# Patient Record
Sex: FEMALE | Race: BLACK OR AFRICAN AMERICAN | NOT HISPANIC OR LATINO | URBAN - METROPOLITAN AREA
[De-identification: names, ages, dates, MRNs, and addresses within clinical notes are randomized per-mention and may not be internally consistent; named-entity substitution may affect disease eponyms.]

---

## 2024-08-01 ENCOUNTER — LAB REQUISITION (OUTPATIENT)
Dept: LAB | Facility: HOSPITAL | Age: 54
End: 2024-08-01

## 2024-08-01 DIAGNOSIS — N77.1 VAGINITIS, VULVITIS AND VULVOVAGINITIS IN DISEASES CLASSIFIED ELSEWHERE: ICD-10-CM

## 2024-08-01 PROCEDURE — 87591 N.GONORRHOEAE DNA AMP PROB: CPT

## 2024-08-01 PROCEDURE — 87491 CHLMYD TRACH DNA AMP PROBE: CPT

## 2024-08-01 PROCEDURE — 87661 TRICHOMONAS VAGINALIS AMPLIF: CPT

## 2024-08-01 PROCEDURE — 87086 URINE CULTURE/COLONY COUNT: CPT

## 2024-08-02 LAB
C TRACH RRNA SPEC QL NAA+PROBE: NEGATIVE
N GONORRHOEA DNA SPEC QL PROBE+SIG AMP: NEGATIVE
T VAGINALIS RRNA SPEC QL NAA+PROBE: NEGATIVE

## 2024-08-03 LAB — BACTERIA UR CULT: ABNORMAL

## 2024-08-04 ENCOUNTER — LAB REQUISITION (OUTPATIENT)
Dept: LAB | Facility: HOSPITAL | Age: 54
End: 2024-08-04

## 2024-08-04 DIAGNOSIS — N77.1 VAGINITIS, VULVITIS AND VULVOVAGINITIS IN DISEASES CLASSIFIED ELSEWHERE: ICD-10-CM

## 2024-08-04 PROCEDURE — 87102 FUNGUS ISOLATION CULTURE: CPT

## 2024-08-04 PROCEDURE — 87086 URINE CULTURE/COLONY COUNT: CPT

## 2024-08-06 LAB — BACTERIA UR CULT: NORMAL

## 2024-08-08 LAB — YEAST SPEC QL CULT: NORMAL

## 2024-09-11 ENCOUNTER — OFFICE VISIT (OUTPATIENT)
Dept: URGENT CARE | Age: 54
End: 2024-09-11
Payer: COMMERCIAL

## 2024-09-11 VITALS
BODY MASS INDEX: 24.98 KG/M2 | DIASTOLIC BLOOD PRESSURE: 78 MMHG | RESPIRATION RATE: 20 BRPM | HEART RATE: 98 BPM | TEMPERATURE: 100.3 F | OXYGEN SATURATION: 99 % | SYSTOLIC BLOOD PRESSURE: 114 MMHG | HEIGHT: 63 IN | WEIGHT: 141 LBS

## 2024-09-11 DIAGNOSIS — J40 BRONCHITIS: Primary | ICD-10-CM

## 2024-09-11 DIAGNOSIS — R50.9 FEVER, UNSPECIFIED FEVER CAUSE: ICD-10-CM

## 2024-09-11 DIAGNOSIS — J02.9 SORE THROAT: ICD-10-CM

## 2024-09-11 PROBLEM — E05.00 GRAVES DISEASE: Status: ACTIVE | Noted: 2022-05-16

## 2024-09-11 PROBLEM — E05.90 HYPERTHYROIDISM: Status: ACTIVE | Noted: 2022-05-16

## 2024-09-11 LAB — POC RAPID STREP: NEGATIVE

## 2024-09-11 RX ORDER — MONTELUKAST SODIUM 4 MG/1
1 TABLET, CHEWABLE ORAL 2 TIMES DAILY
COMMUNITY
Start: 2024-07-26

## 2024-09-11 RX ORDER — METHIMAZOLE 10 MG/1
TABLET ORAL EVERY 24 HOURS
COMMUNITY

## 2024-09-11 RX ORDER — AZITHROMYCIN 250 MG/1
TABLET, FILM COATED ORAL
Qty: 6 TABLET | Refills: 0 | Status: SHIPPED | OUTPATIENT
Start: 2024-09-11 | End: 2024-09-16

## 2024-09-11 ASSESSMENT — ENCOUNTER SYMPTOMS
ABDOMINAL PAIN: 0
NAUSEA: 0
EYE ITCHING: 0
COUGH: 1
EYE PAIN: 0
CHILLS: 1
VOMITING: 0
HEMATURIA: 0
SINUS PAIN: 0
DYSURIA: 0
JOINT SWELLING: 0
FATIGUE: 0
FEVER: 1
DIARRHEA: 0
SHORTNESS OF BREATH: 0
FLANK PAIN: 0
SORE THROAT: 0
ARTHRALGIAS: 0
FREQUENCY: 0
RHINORRHEA: 1
CHEST TIGHTNESS: 0
CONFUSION: 0
EYE DISCHARGE: 0
AGITATION: 0
EYE REDNESS: 0

## 2024-09-11 ASSESSMENT — PAIN SCALES - GENERAL: PAINLEVEL: 4

## 2024-09-11 NOTE — PROGRESS NOTES
Subjective   Patient ID: Renetta Kumar is a 54 y.o. female. They present today with a chief complaint of Cough.    History of Present Illness  Pt presented with cold symptoms starting Monday. Reports fever, cough, chills, discharge. Reports did COVID test three time with negative result. Pt states fever not improved with OTC cold meds. 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       Cough  Associated symptoms include chills, a fever and rhinorrhea. Pertinent negatives include no chest pain, ear pain, eye redness, rash, sore throat or shortness of breath.       Past Medical History  Allergies as of 2024    (No Known Allergies)       (Not in a hospital admission)       Past Medical History:   Diagnosis Date    Personal history of diseases of the blood and blood-forming organs and certain disorders involving the immune mechanism     History of iron deficiency anemia       Past Surgical History:   Procedure Laterality Date    DILATION AND CURETTAGE OF UTERUS  2013    Dilation And Curettage Of Cervical Stump    OTHER SURGICAL HISTORY  2013    Surgically Induced  - By Dilation And Evacuation        reports that she has never smoked. She has never used smokeless tobacco. She reports current alcohol use.    Review of Systems  Review of Systems   Constitutional:  Positive for chills and fever. Negative for fatigue.   HENT:  Positive for rhinorrhea. Negative for ear discharge, ear pain, sinus pain, sneezing and sore throat.    Eyes:  Negative for pain, discharge, redness and itching.   Respiratory:  Positive for cough. Negative for chest tightness and shortness of breath.    Cardiovascular:  Negative for chest pain.   Gastrointestinal:  Negative for abdominal pain, diarrhea, nausea and vomiting.   Genitourinary:  Negative for dysuria, flank pain, frequency, hematuria and urgency.   Musculoskeletal:  Negative for arthralgias and joint swelling.   Skin:  Negative for  "rash.   Psychiatric/Behavioral:  Negative for agitation and confusion.                                   Objective    Vitals:    09/11/24 1153   BP: 114/78   BP Location: Right arm   Patient Position: Sitting   BP Cuff Size: Adult   Pulse: 98   Resp: 20   Temp: (!) 37.9 °C (100.3 °F)   TempSrc: Oral   SpO2: 99%   Weight: 64 kg (141 lb)   Height: 1.588 m (5' 2.5\")     No LMP recorded.    Physical Exam  Constitutional:       Appearance: Normal appearance.   HENT:      Head: Normocephalic and atraumatic.      Right Ear: Tympanic membrane, ear canal and external ear normal.      Left Ear: Tympanic membrane, ear canal and external ear normal.      Nose: Nose normal.      Mouth/Throat:      Mouth: Mucous membranes are dry.      Pharynx: Oropharynx is clear. Posterior oropharyngeal erythema present.   Cardiovascular:      Rate and Rhythm: Regular rhythm. Tachycardia present.      Heart sounds: No murmur heard.  Pulmonary:      Effort: Pulmonary effort is normal. No respiratory distress.      Breath sounds: No stridor. No wheezing, rhonchi or rales.   Musculoskeletal:         General: No swelling or tenderness. Normal range of motion.   Neurological:      Mental Status: She is alert and oriented to person, place, and time.   Psychiatric:         Mood and Affect: Mood normal.         Procedures    Point of Care Test & Imaging Results from this visit  Results for orders placed or performed in visit on 09/11/24   POCT rapid strep A manually resulted   Result Value Ref Range    POC Rapid Strep Negative Negative     No results found.    Diagnostic study results (if any) were reviewed by Suzy Wheeler PA-C.    Assessment/Plan   Allergies, medications, history, and pertinent labs/EKGs/Imaging reviewed by Suzy Wheeler PA-C.     Medical Decision Making  CXR preliminary reading no acute abnormalities  Low grade fever, Rapid strep negative  No s/s indicating medical emergency    Orders and Diagnoses      Medical Admin Record      Follow Up " Instructions  No follow-ups on file.    Patient disposition: Home    Electronically signed by Suzy Wheeler PA-C  12:53 PM

## 2024-09-11 NOTE — PATIENT INSTRUCTIONS
Take medication as instructed  Continue OTC cold meds, Ibuprofen as needed, hydration, rest  F/U with PCP for lingering symptoms